# Patient Record
Sex: FEMALE | Race: WHITE | NOT HISPANIC OR LATINO | Employment: OTHER | URBAN - METROPOLITAN AREA
[De-identification: names, ages, dates, MRNs, and addresses within clinical notes are randomized per-mention and may not be internally consistent; named-entity substitution may affect disease eponyms.]

---

## 2017-01-01 ENCOUNTER — ALLSCRIPTS OFFICE VISIT (OUTPATIENT)
Dept: OTHER | Facility: OTHER | Age: 82
End: 2017-01-01

## 2017-01-01 ENCOUNTER — APPOINTMENT (OUTPATIENT)
Dept: LAB | Facility: HOSPITAL | Age: 82
End: 2017-01-01
Attending: SURGERY
Payer: COMMERCIAL

## 2017-01-01 ENCOUNTER — HOSPITAL ENCOUNTER (EMERGENCY)
Facility: HOSPITAL | Age: 82
End: 2017-07-23
Attending: EMERGENCY MEDICINE | Admitting: EMERGENCY MEDICINE
Payer: COMMERCIAL

## 2017-01-01 ENCOUNTER — HOSPITAL ENCOUNTER (OUTPATIENT)
Dept: CT IMAGING | Facility: CLINIC | Age: 82
Discharge: HOME | End: 2017-05-05
Payer: COMMERCIAL

## 2017-01-01 ENCOUNTER — TRANSCRIBE ORDERS (OUTPATIENT)
Dept: LAB | Facility: HOSPITAL | Age: 82
End: 2017-01-01

## 2017-01-01 VITALS
TEMPERATURE: 103.5 F | SYSTOLIC BLOOD PRESSURE: 138 MMHG | WEIGHT: 121.25 LBS | OXYGEN SATURATION: 88 % | DIASTOLIC BLOOD PRESSURE: 65 MMHG | BODY MASS INDEX: 21.48 KG/M2

## 2017-01-01 DIAGNOSIS — I46.9 CARDIAC ARREST (HCC): Primary | ICD-10-CM

## 2017-01-01 DIAGNOSIS — K86.9 DISEASE OF PANCREAS: ICD-10-CM

## 2017-01-01 LAB
BUN SERPL-MCNC: 19 MG/DL (ref 5–25)
CREAT SERPL-MCNC: 1.03 MG/DL (ref 0.6–1.3)
GFR SERPL CREATININE-BSD FRML MDRD: 50.5 ML/MIN/1.73SQ M
GLUCOSE SERPL-MCNC: 103 MG/DL (ref 65–140)

## 2017-01-01 PROCEDURE — 84484 ASSAY OF TROPONIN QUANT: CPT

## 2017-01-01 PROCEDURE — 82948 REAGENT STRIP/BLOOD GLUCOSE: CPT

## 2017-01-01 PROCEDURE — 84520 ASSAY OF UREA NITROGEN: CPT

## 2017-01-01 PROCEDURE — 96374 THER/PROPH/DIAG INJ IV PUSH: CPT

## 2017-01-01 PROCEDURE — 92950 HEART/LUNG RESUSCITATION CPR: CPT

## 2017-01-01 PROCEDURE — 82565 ASSAY OF CREATININE: CPT

## 2017-01-01 PROCEDURE — 99285 EMERGENCY DEPT VISIT HI MDM: CPT

## 2017-01-01 PROCEDURE — 96375 TX/PRO/DX INJ NEW DRUG ADDON: CPT

## 2017-01-01 PROCEDURE — 36415 COLL VENOUS BLD VENIPUNCTURE: CPT

## 2017-01-01 PROCEDURE — 74177 CT ABD & PELVIS W/CONTRAST: CPT

## 2017-01-01 RX ORDER — SODIUM BICARBONATE 84 MG/ML
INJECTION, SOLUTION INTRAVENOUS CODE/TRAUMA/SEDATION MEDICATION
Status: COMPLETED | OUTPATIENT
Start: 2017-01-01 | End: 2017-01-01

## 2017-01-01 RX ORDER — DEXTROSE 50 % IN WATER 50 %
SYRINGE (ML) INTRAVENOUS CODE/TRAUMA/SEDATION MEDICATION
Status: COMPLETED | OUTPATIENT
Start: 2017-01-01 | End: 2017-01-01

## 2017-01-01 RX ORDER — EPINEPHRINE 0.1 MG/ML
SYRINGE (ML) INJECTION CODE/TRAUMA/SEDATION MEDICATION
Status: COMPLETED | OUTPATIENT
Start: 2017-01-01 | End: 2017-01-01

## 2017-01-01 RX ORDER — CALCIUM CHLORIDE 100 MG/ML
SYRINGE (ML) INTRAVENOUS CODE/TRAUMA/SEDATION MEDICATION
Status: COMPLETED | OUTPATIENT
Start: 2017-01-01 | End: 2017-01-01

## 2017-01-01 RX ORDER — CALCIUM CHLORIDE 100 MG/ML
INJECTION INTRAVENOUS; INTRAVENTRICULAR
Status: DISCONTINUED
Start: 2017-01-01 | End: 2017-01-01 | Stop reason: HOSPADM

## 2017-01-01 RX ADMIN — CALCIUM CHLORIDE 1 G: 100 INJECTION PARENTERAL at 21:00

## 2017-01-01 RX ADMIN — EPINEPHRINE 1 MG: 0.1 INJECTION, SOLUTION ENDOTRACHEAL; INTRACARDIAC; INTRAVENOUS at 21:10

## 2017-01-01 RX ADMIN — EPINEPHRINE 1 MG: 0.1 INJECTION, SOLUTION ENDOTRACHEAL; INTRACARDIAC; INTRAVENOUS at 21:07

## 2017-01-01 RX ADMIN — DEXTROSE MONOHYDRATE 25 G: 500 INJECTION PARENTERAL at 20:54

## 2017-01-01 RX ADMIN — SODIUM BICARBONATE 50 MEQ: 84 INJECTION, SOLUTION INTRAVENOUS at 20:52

## 2017-01-01 RX ADMIN — EPINEPHRINE 1 MG: 0.1 INJECTION, SOLUTION ENDOTRACHEAL; INTRACARDIAC; INTRAVENOUS at 21:04

## 2017-01-01 RX ADMIN — EPINEPHRINE 1 MG: 0.1 INJECTION, SOLUTION ENDOTRACHEAL; INTRACARDIAC; INTRAVENOUS at 21:01

## 2017-01-01 RX ADMIN — CALCIUM CHLORIDE 1 G: 100 INJECTION PARENTERAL at 20:57

## 2017-01-01 RX ADMIN — EPINEPHRINE 1 MG: 0.1 INJECTION, SOLUTION ENDOTRACHEAL; INTRACARDIAC; INTRAVENOUS at 20:58

## 2017-01-01 RX ADMIN — EPINEPHRINE 1 MG: 0.1 INJECTION, SOLUTION ENDOTRACHEAL; INTRACARDIAC; INTRAVENOUS at 20:52

## 2017-01-01 RX ADMIN — CALCIUM CHLORIDE 1 G: 100 INJECTION PARENTERAL at 20:53

## 2017-01-01 RX ADMIN — SODIUM BICARBONATE 50 MEQ: 84 INJECTION, SOLUTION INTRAVENOUS at 21:00

## 2017-01-01 RX ADMIN — IODIXANOL 70 ML: 320 INJECTION, SOLUTION INTRAVASCULAR at 14:11

## 2017-07-27 LAB
SPECIMEN SOURCE: ABNORMAL
TROPONIN I BLD-MCNC: 0.97 NG/ML (ref 0–0.08)

## 2018-01-12 VITALS
DIASTOLIC BLOOD PRESSURE: 58 MMHG | BODY MASS INDEX: 20.2 KG/M2 | HEIGHT: 63 IN | SYSTOLIC BLOOD PRESSURE: 120 MMHG | OXYGEN SATURATION: 94 % | WEIGHT: 114 LBS | HEART RATE: 72 BPM

## 2018-01-13 VITALS
BODY MASS INDEX: 20.55 KG/M2 | HEART RATE: 72 BPM | TEMPERATURE: 97.9 F | DIASTOLIC BLOOD PRESSURE: 62 MMHG | RESPIRATION RATE: 18 BRPM | HEIGHT: 63 IN | SYSTOLIC BLOOD PRESSURE: 150 MMHG | WEIGHT: 116 LBS

## 2018-01-13 NOTE — RESULT NOTES
Verified Results  (1) NON-GYNECOLOGIC CYTOLOGY 20Oct2016 01:46PM [facility]     Test Name Result Flag Reference   LAB AP CASE REPORT (Report)     Non-gynecologic Cytology             Case: [id number]                  Authorizing Provider: Jalen Patel MD       Collected:      10/20/2016 1346        Ordering Location:   [address] [place] [address]   [facility]      [facility]                               Pathologist:      Lisa Dunbar MD                              Specimen:  Pancreas, tail   LAB AP CYTO FINAL DIAGNOSIS (Report)     A  Pancreas, tail, ultrasound-guided fine needle aspirate:  Non-diagnostic (PSC Category I) - See note  Only rare benign squamous epithelial fragment is present  No pancreatic   parenchymal cells seen  Note: The above diagnostic category is part of the six-tiered system   proposed by The Papan[place] Society of Cytopathology for the reporting of   pancreaticobiliary specimens  This proposed scheme provides terminology   that correlates the cytologic diagnostic nomenclature with the 2010 WHO   classification of pancreatic tumors and standardizes the categorization of   the various diseases of the pancreas, some of which are difficult to   diagnose specifically by cytology  *The Papan[place] Society of Cytopathology System for Reporting   Pancreaticobiliary Cytology: Definitions, Criteria and Explanatory Notes  Asia Smith; Paiz, 1600  Electronically signed by Lisa Dunbar MD on 10/24/2016 at 3:51 PM   LAB AP GROSS DESCRIPTION      A  Pancreas, tail: 20ml   Clear, colorless, received in [facility]   LAB AP NONGYN ADDITIONAL INFORMATION (Report)     [employer]'s FDA approved ,  and ThinPrep Imaging System are   utilized with strict adherence to the 's instruction manual to   prepare gynecologic and non-gynecologic cytology specimens for the   production of ThinPrep slides as well as for gynecologic ThinPrep imaging  These processes have been validated by our laboratory and/or by the     These tests were developed and their performance characteristics   determined by [facility] or [employer]  They may not be cleared or approved by the [place]  Food and   Drug Administration  The FDA has determined that such clearance or   approval is not necessary  These tests are used for clinical purposes  They should not be regarded as investigational or for research  This   laboratory has been approved by CLIA 88, designated as a high-complexity   laboratory and is qualified to perform these tests

## 2018-01-14 VITALS — DIASTOLIC BLOOD PRESSURE: 60 MMHG | WEIGHT: 114 LBS | BODY MASS INDEX: 20.19 KG/M2 | SYSTOLIC BLOOD PRESSURE: 188 MMHG

## 2018-01-17 NOTE — PROGRESS NOTES
Assessment  Assessed    1  Atrial fibrillation (427 31) (I48 91)   2  Chronic anticoagulation (V58 61) (Z79 01)   3  Hypertension (401 9) (I10)   4  Diastolic dysfunction (042 6) (I51 9)   5  Dyslipidemia (272 4) (E78 5)   6  AI (aortic incompetence) (424 1) (I35 1)   7  Pulmonary fibrosis (515) (J84 10)   8  Mitral annular calcification (424 0) (I05 9)   9  Non-rheumatic mitral regurgitation (424 0) (I34 0)    Plan  Atrial fibrillation    · From  Nebivolol HCl 2 5 MG TABS TAKE 1 TABLET DAILY To Bystolic 2 5 MG  Oral Tablet TAKE 1 TABLET DAILY   Rx By: [employer]; Dispense: 90 Days ; #:90 Tablet; Refill: 2; For: Atrial fibrillation; EKATERINA = N; [id number]    Discussion/Summary  Counseling Documentation With Imm: The patient, patient's family was counseled regarding instructions for management, risk factor reductions, patient and family education, risks and benefits of treatment options, importance of compliance with treatment  Medication SE Review and Pt Understands Tx: Possible side effects of new medications were reviewed with the patient/guardian today  The treatment plan was reviewed with the patient/guardian  The patient/guardian understands and agrees with the treatment plan       Discussion Summary:   2-D echocardiogram April 2015 [facility] - LVEF 03-15%, grade 1 diastolic dysfunction, moderate AI, mild MAC, mild MR, mild TR  A fib on chronic AC - Cont Apixaban for AC  Vent rate well controlled on digoxin, diltiazem CD, beta blocker  Continue the same for now  Renal function needs close f/u in view of digoxin use  HTN, DD - BP stable on current meds  Continue the same  Dyslipidemia - Pt on Atorvastatin which she is tolerating well  FLP before next visit  AI, MR, MAC - Stable    Recommend risk factor modification and therapeutic lifestyle changes   Low salt, low calorie, low fat, low cholesterol diet  Discussed concepts of Afib, AC, atherosclerosis, including signs and symptoms of cardiac disease  Previous records from [facility] were obtained and reviewed  Safety measures were reviewed  Questions were entertained and answered  Patient was advised to report any problem requiring medical attention  Follow up with appropriate specialists and lab work as discussed  Return for follow up visit as scheduled or earlier, if needed  Thank you for allowing me to participate in the care and evaluation of your patient  Should you have any questions, please feel free to contact me  Chief Complaint  Chief Complaint Chronic Condition [facility]: Patient is here today for follow up of chronic conditions described in HPI  History of Present Illness  HPI: 42-year-old female with history of CVA in March 2015, atrial fibrillation, HTN, dyslipidemia, AI, MR, MAC, Pulmonary fibrosis who comes for f/u  Atrial fibrillation on chronic AC - diagnosed when patient was admitted with stroke in March 2015  On Apixaban for anticoagulation and digoxin, diltiazem CD, Nebivolol for rate control  Patient denies palpitations, lightheadedness, syncope, chest pain/pressure  HTN - on diltiazem CD, Nebivolol  Denies lightheadedness, headache  Dyslipidemia - patient on atorvastatin that she is tolerating well  FLP (Aug 2015) - , HDL 65,   AI, MR, MAC - stable  Asymptomatic for pt  Patient denies any complaints of chest pain/pressure, SOB (above baseline sec to pulm fibrosis), syncope, swelling feet, orthopnea, PND, claudication, bleeding from any site  Occasionally feels some momentary palpitations which do not last for long and are not associated with any other symptoms  She uses a wheelchair for mobility      Review of Systems  Cardiology Female ROS:     Cardiac: as noted in HPI  Skin: No complaints of nonhealing sores or skin rash     Genitourinary: No complaints of recurrent urinary tract infections, frequent urination at night, difficult urination, blood in urine, kidney stones, loss of bladder control, kidney problems, denies any birth control or hormone replacement, is not post menopausal, not currently pregnant  Psychological: No complaints of feeling depressed, anxiety, panic attacks, or difficulty concentrating  General: No complaints of trouble sleeping, lack of energy, fatigue, appetite changes, weight changes, fever, frequent infections, or night sweats  Respiratory: No complaints of shortness of breath, cough with sputum, or wheezing  HEENT: No complaints of serious problems, hearing problems, nose problems, throat problems, or snoring  Gastrointestinal: No complaints of liver problems, nausea, vomiting, heartburn, constipation, bloody stools, diarrhea, problems swallowing, adbominal pain, or rectal bleeding  Hematologic: No complaints of bleeding disorders, anemia, blood clots, or excessive brusing  Neurological: No complaints of numbness, tingling, dizziness, weakness, seizures, headaches, syncope or fainting, AM fatigue, daytime sleepiness, no witnessed apnea episodes  Musculoskeletal: arthritis       ROS Reviewed:   ROS reviewed  Active Problems  Problems    1  AI (aortic incompetence) (424 1) (I35 1)   2  Atrial fibrillation (427 31) (I48 91)   3  Chronic anticoagulation (V58 61) (Z79 01)   4  Dyslipidemia (272 4) (E78 5)   5  Hypertension (401 9) (I10)   6  Lumbar facet arthropathy (721 3) (M47 816)   7  Lumbar stenosis without neurogenic claudication (724 02) (M48 06)   8  Milia (706 2) (L72 0)   9  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)   10  Osteoporosis (733 00) (M81 0)   11  Pulmonary aspiration (934 9) (T17 900A)   12  Pulmonary fibrosis (515) (J84 10)   13  Raynauds disease (443 0) (I73 00)   14  Scoliosis (737 30) (M41 9)   15  Screening for skin condition (V82 0) (Z13 89)   16  Seborrheic keratosis (702 19) (L82 1)   17  Spondylosis of cervical region without myelopathy or radiculopathy (721 0) (M47 812)   18  Stroke (434 91) (I63 9)   19   Visual disturbances (368 9) (H53 9)    Past Medical History  Problems    1  History of Emphysema/COPD (492 8) (J43 9)   2  H/O nonmelanoma skin cancer (V10 83) (G43 851)   3  History of Pulmonary fibrosis (515) (J84 10)  Active Problems And Past Medical History Reviewed: The active problems and past medical history were reviewed and updated today  Surgical History  Problems    1  History of Appendectomy   2  History of Back Surgery   3  History of Breast Surgery Lumpectomy   4  History of Cataract Surgery   5  History of Hysterectomy (V88 01)   6  History of Tonsillectomy  Surgical History Reviewed: The surgical history was reviewed and updated today  Family History  Father    1  Family history of CAD (coronary artery disease)  Daughter    2  Family history of systemic lupus erythematosus (V19 4) (Z82 69)  Son    3  Family history of cerebrovascular accident (V17 1) (Z82 3)   4  Family history of Parkinson's disease (V17 2) (Z82 0)   5  Family history of Seizures  Other    6  Family history of autism (V17 0) (Z81 8)  Family History Reviewed: The family history was reviewed and updated today  Social History  Problems    · Never a smoker   · No alcohol use  Social History Reviewed: The social history was reviewed and updated today  The social history was reviewed and is unchanged  Current Meds   1  Atorvastatin Calcium 20 MG Oral Tablet; TAKE 1 TABLET DAILY; Therapy: 10Sep2015 to (Evaluate:08Jan2016)  Requested for: 10Sep2015; Last   Rx:10Sep2015 Ordered   2  Calcium Carbonate 600 MG Oral Tablet; TAKE 2 TABLETS DAILY; Therapy: (Recorded:61Nfj3477) to Recorded   3  Daily Multivitamin TABS; Therapy: (Recorded:43Vzx3945) to Recorded   4  Digoxin 125 MCG Oral Tablet; TAKE 1 TABLET DAILY; Therapy: (Recorded:04Rlc7809) to Recorded   5  Diltiazem  MG Oral Capsule Extended Release 24 Hour; TAKE 1 CAPSULE   DAILY; Therapy: (Recorded:42Rdu8564) to Recorded   6   Eliquis 2 5 MG Oral Tablet; Take 1 tablet twice daily; Therapy: (Recorded:08Hfh3770) to Recorded   7  FLUoxetine HCl - 40 MG Oral Capsule; TAKE 1 CAPSULE DAILY; Therapy: (Recorded:97Oim8959) to Recorded   8  Levalbuterol HCl Powder; USE AS DIRECTED; Therapy: ([phone]) to Recorded   9  Levothyroxine Sodium 75 MCG Oral Tablet; TAKE 1 TABLET DAILY; Therapy: (865 797 963) to Recorded   10  LORazepam 1 MG Oral Tablet; TAKE 1 5 TABLET Bedtime; Last Rx:06Nov2015 Ordered   11  Nebivolol HCl 2 5 MG TABS; TAKE 1 TABLET DAILY; Therapy: (489 486 993) to Recorded   12  Oyst-Jim D TABS; Therapy: (076 279 283) to Recorded   13  PredniSONE 10 MG Oral Tablet; TAKE TABLET Daily; Therapy: (966 988 563) to Recorded   14  Restasis 0 05 % Ophthalmic Emulsion; Therapy: (Recorded:34Ous1688) to Recorded   15  Tramadol-Acetaminophen 37 5-325 MG Oral Tablet; TAKE 1 TABLET 3 TIMES DAILY AS    NEEDED; Therapy: 20AQS2800 to (Evaluate:29Alc9829); Last Rx:06Nov2015 Ordered  Medication List Reviewed: The medication list was reviewed and updated today  Allergies  Medication    1  Demerol TABS   2  Morphine Derivatives   3  Percocet TABS   4  Tuna Flavor LIQD  Non-Medication    5  Peanuts    Vitals  Vital Signs [Data Includes: Current Encounter]    Recorded: [id number] 01:41PM   Heart Rate 62   Systolic 862   Diastolic 58   Height 5 ft 3 in   Weight 120 lb    BMI Calculated 21 26   BSA Calculated 1 56   O2 Saturation 95     Physical Exam    Constitutional   General appearance: Abnormal   Examined on a wheelchair  Eyes   Conjunctiva and Sclera examination: Conjunctiva pink, sclera anicteric  Ears, Nose, Mouth, and Throat - External inspection of ears and nose: Normal without deformities or discharge  Oropharynx: Clear, nares are clear, mucous membranes are moist    Neck   Neck and thyroid: Normal, supple, trachea midline, no thyromegaly      Pulmonary   Respiratory effort: No increased work of breathing or signs of respiratory distress  Auscultation of lungs: Clear to auscultation, no rales, no rhonchi, no wheezing, good air movement  Cardiovascular   Palpation of heart: Normal PMI, no thrills  Auscultation of heart: Abnormal   Gr 2/6 EDM at left sternal border 4th ics  Carotid pulses: Normal, 2+ bilaterally  Pedal pulses: Normal, 2+ bilaterally  Examination of extremities for edema and/or varicosities: Normal     Chest - Chest: Normal    Abdomen   Abdomen: Non-tender and no distention  Liver and spleen: No hepatomegaly or splenomegaly  Musculoskeletal Gait and station: Normal gait  Digits and nails: Normal without clubbing or cyanosis  Inspection/palpation of joints, bones, and muscles: Normal, ROM normal     Skin - Skin and subcutaneous tissue: Normal without rashes or lesions  Skin is warm and well perfused, normal turgor  Neurologic - Speech: Normal     Psychiatric - Orientation to person, place, and time: Normal  Mood and affect: Normal       Future Appointments    Date/Time Provider Specialty Site   02/08/2016 03:35 PM Lam Ruano MD Neurology [address]   04/21/2016 01:30 PM MANGO López   Cardiology [facility] CARDIOLOGY [facility]     Signatures   Electronically signed by : MANGO Ortiz ; Hai 15 2016  3:39PM EST                       (Author)